# Patient Record
Sex: MALE | URBAN - METROPOLITAN AREA
[De-identification: names, ages, dates, MRNs, and addresses within clinical notes are randomized per-mention and may not be internally consistent; named-entity substitution may affect disease eponyms.]

---

## 2022-11-29 DIAGNOSIS — Z12.11 COLON CANCER SCREENING: Primary | ICD-10-CM

## 2022-12-21 ENCOUNTER — TELEPHONE (OUTPATIENT)
Dept: GASTROENTEROLOGY | Facility: CLINIC | Age: 52
End: 2022-12-21
Payer: COMMERCIAL

## 2022-12-21 DIAGNOSIS — Z12.11 COLON CANCER SCREENING: Primary | ICD-10-CM

## 2022-12-21 NOTE — TELEPHONE ENCOUNTER
----- Message from Dina Kamilla sent at 12/21/2022  1:37 PM CST -----  Type:  Needs Medical Advice    Who Called: Ed Peña    Symptoms (please be specific): =-   How long has patient had these symptoms:  -  Pharmacy name and phone #:  -  Would the patient rather a call back or a response via [facility]? CB   Best Call Back Number: [phone]    Additional Information: pt is waiting on a CB to schedule a colonoscopy, he was referred  Dr Monge  ( referral is in pending authorization status )

## 2022-12-21 NOTE — TELEPHONE ENCOUNTER
Patient calling back to check on colonoscopy. Notified him that referral has been received, and schedulers will be contacting him.

## 2023-02-24 ENCOUNTER — TELEPHONE (OUTPATIENT)
Dept: GASTROENTEROLOGY | Facility: CLINIC | Age: 53
End: 2023-02-24
Payer: COMMERCIAL

## 2023-02-24 VITALS — WEIGHT: 240 LBS | BODY MASS INDEX: 34.36 KG/M2 | HEIGHT: 70 IN

## 2023-02-24 DIAGNOSIS — Z12.11 COLON CANCER SCREENING: Primary | ICD-10-CM

## 2023-02-24 NOTE — TELEPHONE ENCOUNTER
I contacted patient and scheduled Colonoscopy @ [facility] with Dr Contreras on 4/11/23.  BMI is 34.44.  Patient's first Colonoscopy.  I discussed the prep instructions and patient will  instructions.  Jaylyn

## 2023-02-27 RX ORDER — SOD SULF/POT CHLORIDE/MAG SULF 1.479 G
12 TABLET ORAL DAILY
Qty: 24 TABLET | Refills: 0 | Status: SHIPPED | OUTPATIENT
Start: 2023-02-27

## 2023-02-28 NOTE — TELEPHONE ENCOUNTER
"[facility]  [address] [place] [address]  Phone: [phone]  Fax: [phone]    History & Physical         Provider: Dr. Addison Contreras    Patient Name: Ed VELÁZQUEZ (age):1970  52 y.o.           Gender: male   Phone: [phone]     Referring Physician: Bessie Monge     Vital Signs:   Height - 5' 10"  Weight - 240 lbs  BMI - 34.44     Plan: Colonoscopy    Encounter Diagnosis   Name Primary?    Colon cancer screening Yes           History:      Past Medical History:   Diagnosis Date    Anxiety disorder, unspecified     BMI 34.0-34.9,adult     Herniated disc, cervical     Right side    Testosterone deficiency in male       Past Surgical History:   Procedure Laterality Date    TONSILLECTOMY        Medication List with Changes/Refills   New Medications    SOD SULF-POT CHLORIDE-MAG SULF (SUTAB) 1.479-0.188- 0.225 GRAM TABLET    Take 12 tablets by mouth once daily. Take according to package instructions with indicated amount of water. No breakfast day before test. May substitute with Suprep, Clenpiq, Plenvu, Moviprep or GoLytely based on Rx plan and patient preference.   Current Medications    FLUOXETINE 20 MG CAPSULE        TESTOSTERONE (ANDROGEL) 1 % (50 MG/5 GRAM) GLPK          Review of patient's allergies indicates:  No Known Allergies   Family History   Problem Relation Age of Onset    Alzheimer's disease Mother     Hypertension Father     Diabetes Brother     Hypertension Brother     Heart attack Maternal Grandmother     Alzheimer's disease Maternal Grandfather     Other Paternal Grandmother         Natural Causes    Other Paternal Grandfather         Natural Causes      Social History     Tobacco Use    Smoking status: Some Days     Types: Cigars     Passive exposure: Never    Smokeless tobacco: Never   Substance Use Topics    Alcohol use: Yes     Comment: Occasional    Drug " use: Never        Physical Examination:     General Appearance:___________________________  HEENT: _____________________________________  Abdomen:____________________________________  Heart:________________________________________  Lungs:_______________________________________  Extremities:___________________________________  Skin:_________________________________________  Endocrine:____________________________________  Genitourinary:_________________________________  Neurological:__________________________________      Patient has been evaluated immediately prior to sedation and is medically cleared for endoscopy with IVCS as an ASA class: ______      Physician Signature: _________________________       Date: ________  Time: ________

## 2023-04-11 ENCOUNTER — OUTSIDE PLACE OF SERVICE (OUTPATIENT)
Dept: GASTROENTEROLOGY | Facility: CLINIC | Age: 53
End: 2023-04-11

## 2023-04-11 LAB — CRC RECOMMENDATION EXT: NORMAL

## 2023-04-11 PROCEDURE — G0121 COLON CA SCRN NOT HI RSK IND: HCPCS | Mod: ,,, | Performed by: INTERNAL MEDICINE

## 2023-04-11 PROCEDURE — G0121 COLON CA SCRN NOT HI RSK IND: ICD-10-PCS | Mod: ,,, | Performed by: INTERNAL MEDICINE

## 2023-05-02 ENCOUNTER — DOCUMENTATION ONLY (OUTPATIENT)
Dept: GASTROENTEROLOGY | Facility: CLINIC | Age: 53
End: 2023-05-02
Payer: COMMERCIAL

## 2024-10-08 ENCOUNTER — OFFICE VISIT (OUTPATIENT)
Dept: FAMILY MEDICINE | Facility: CLINIC | Age: 54
End: 2024-10-08
Payer: COMMERCIAL

## 2024-10-08 ENCOUNTER — TELEPHONE (OUTPATIENT)
Dept: FAMILY MEDICINE | Facility: CLINIC | Age: 54
End: 2024-10-08

## 2024-10-08 VITALS
TEMPERATURE: 97 F | OXYGEN SATURATION: 98 % | DIASTOLIC BLOOD PRESSURE: 80 MMHG | BODY MASS INDEX: 34.53 KG/M2 | HEIGHT: 70 IN | SYSTOLIC BLOOD PRESSURE: 130 MMHG | RESPIRATION RATE: 15 BRPM | WEIGHT: 241.19 LBS | HEART RATE: 67 BPM

## 2024-10-08 DIAGNOSIS — K21.9 GASTROESOPHAGEAL REFLUX DISEASE, UNSPECIFIED WHETHER ESOPHAGITIS PRESENT: ICD-10-CM

## 2024-10-08 DIAGNOSIS — N52.9 ERECTILE DYSFUNCTION, UNSPECIFIED ERECTILE DYSFUNCTION TYPE: ICD-10-CM

## 2024-10-08 DIAGNOSIS — F41.0 PANIC DISORDER: ICD-10-CM

## 2024-10-08 DIAGNOSIS — Z76.89 ENCOUNTER TO ESTABLISH CARE: Primary | ICD-10-CM

## 2024-10-08 LAB
ABS NRBC COUNT: 0 THOU/UL (ref 0–0.01)
ABSOLUTE BASOPHIL: 0 10*3/UL (ref 0–0.3)
ABSOLUTE EOSINOPHIL: 0.2 10*3/UL (ref 0–0.6)
ABSOLUTE IMMATURE GRAN: 0.01 THOU/UL (ref 0–0.03)
ABSOLUTE LYMPHOCYTE: 1.2 10*3/UL (ref 1.2–4)
ABSOLUTE MONOCYTE: 0.3 10*3/UL (ref 0.1–0.8)
ALBUMIN SERPL BCP-MCNC: 4 G/DL (ref 3.4–5)
ALBUMIN/GLOBULIN RATIO: 1.3 RATIO (ref 1.1–1.8)
ALP SERPL-CCNC: 56 U/L (ref 45–117)
ALT SERPL W P-5'-P-CCNC: 21 U/L (ref 16–61)
ANION GAP SERPL CALC-SCNC: 5 MMOL/L (ref 3–11)
AST SERPL-CCNC: 19 U/L (ref 15–37)
BASOPHILS NFR BLD: 0.5 % (ref 0–3)
BILIRUB SERPL-MCNC: 0.9 MG/DL (ref 0.2–1)
BUN SERPL-MCNC: 15 MG/DL (ref 7–18)
BUN/CREAT SERPL: 13.63 RATIO
CALCIUM SERPL-MCNC: 8.9 MG/DL (ref 8.5–10.1)
CHLORIDE SERPL-SCNC: 104 MMOL/L (ref 98–107)
CHOLEST SERPL-MSCNC: 206 MG/DL
CO2 SERPL-SCNC: 28 MMOL/L (ref 21–32)
CREAT SERPL-MCNC: 1.1 MG/DL (ref 0.7–1.3)
EOSINOPHIL NFR BLD: 4.1 % (ref 0–6)
ERYTHROCYTE [DISTWIDTH] IN BLOOD BY AUTOMATED COUNT: 12.1 % (ref 0–15.5)
ESTIMATED AVG GLUCOSE: 108 MG/DL
GFR ESTIMATION: > 60
GLOBULIN: 3.1 G/DL (ref 2.3–3.5)
GLUCOSE SERPL-MCNC: 91 MG/DL (ref 74–106)
HBA1C MFR BLD: 5.2 % (ref 4.2–6.3)
HCT VFR BLD AUTO: 42.1 % (ref 42–52)
HDL/CHOLESTEROL RATIO: 4 RATIO
HDLC SERPL-MCNC: 52 MG/DL
HGB BLD-MCNC: 14.9 G/DL (ref 14–18)
IMMATURE GRANULOCYTES: 0.3 % (ref 0–0.43)
LDLC SERPL CALC-MCNC: 124.8 MG/DL
LYMPHOCYTES NFR BLD: 31 % (ref 20–45)
MCH RBC QN AUTO: 33.6 PG (ref 27–32)
MCHC RBC AUTO-ENTMCNC: 35.4 % (ref 32–36)
MCV RBC AUTO: 94.8 FL (ref 80–97)
MONOCYTES NFR BLD: 8.2 % (ref 2–10)
NEUTROPHILS # BLD AUTO: 2.2 10*3/UL (ref 1.4–7)
NEUTROPHILS NFR BLD: 55.9 % (ref 50–80)
NUCLEATED RED BLOOD CELLS: 0 % (ref 0–0.2)
PLATELETS: 209 10*3/UL (ref 130–400)
PMV BLD AUTO: 9.7 FL (ref 9.2–12.2)
POTASSIUM SERPL-SCNC: 3.7 MMOL/L (ref 3.5–5.1)
PROT SERPL-MCNC: 7.1 G/DL (ref 6.4–8.2)
PSA: 1.29 NG/ML (ref 0.1–4)
RBC # BLD AUTO: 4.44 10*6/UL (ref 4.7–6.1)
SODIUM BLD-SCNC: 137 MMOL/L (ref 131–143)
T4 FREE SP9 P CHAL SERPL-SCNC: 1.03 NG/DL (ref 0.76–1.46)
TRIGL SERPL-MCNC: 146 MG/DL (ref 0–149)
TSH SERPL DL<=0.005 MIU/L-ACNC: 1.09 UIU/ML (ref 0.36–3.74)
VLDL CHOLESTEROL: 29 MG/DL
WBC # BLD: 3.9 10*3/UL (ref 4.5–10)

## 2024-10-08 PROCEDURE — 3008F BODY MASS INDEX DOCD: CPT | Mod: CPTII,S$GLB,, | Performed by: INTERNAL MEDICINE

## 2024-10-08 PROCEDURE — 3079F DIAST BP 80-89 MM HG: CPT | Mod: CPTII,S$GLB,, | Performed by: INTERNAL MEDICINE

## 2024-10-08 PROCEDURE — 3075F SYST BP GE 130 - 139MM HG: CPT | Mod: CPTII,S$GLB,, | Performed by: INTERNAL MEDICINE

## 2024-10-08 PROCEDURE — 1160F RVW MEDS BY RX/DR IN RCRD: CPT | Mod: CPTII,S$GLB,, | Performed by: INTERNAL MEDICINE

## 2024-10-08 PROCEDURE — 1159F MED LIST DOCD IN RCRD: CPT | Mod: CPTII,S$GLB,, | Performed by: INTERNAL MEDICINE

## 2024-10-08 PROCEDURE — 99386 PREV VISIT NEW AGE 40-64: CPT | Mod: S$GLB,,, | Performed by: INTERNAL MEDICINE

## 2024-10-08 RX ORDER — SILDENAFIL 50 MG/1
50 TABLET, FILM COATED ORAL DAILY PRN
Qty: 30 TABLET | Refills: 1 | Status: SHIPPED | OUTPATIENT
Start: 2024-10-08 | End: 2025-10-08

## 2024-10-08 RX ORDER — FLUOXETINE HYDROCHLORIDE 20 MG/1
20 CAPSULE ORAL DAILY
Qty: 90 CAPSULE | Refills: 3 | Status: SHIPPED | OUTPATIENT
Start: 2024-10-08

## 2024-10-08 RX ORDER — OMEPRAZOLE 40 MG/1
40 CAPSULE, DELAYED RELEASE ORAL DAILY
Qty: 90 CAPSULE | Refills: 1 | Status: SHIPPED | OUTPATIENT
Start: 2024-10-08

## 2024-10-08 RX ORDER — SILDENAFIL 25 MG/1
25 TABLET, FILM COATED ORAL DAILY PRN
Qty: 30 TABLET | Refills: 1 | Status: SHIPPED | OUTPATIENT
Start: 2024-10-08 | End: 2024-10-08

## 2024-10-08 NOTE — TELEPHONE ENCOUNTER
Spoke with Angelia.  Advised her the Viagra needs to be 50mg not 25mg.  She verbalized understanding.

## 2024-10-08 NOTE — PROGRESS NOTES
Subjective:       Patient ID: dE Peña is a 53 y.o. male.    Chief Complaint: Establish Care (New pt. To es. Care for Annual.  Former pt. Of Dr. Bessie Monge. No c/o today.)    HPI    53 y.o. male here to establish care.         Health Maintenance Topics with due status: Not Due       Topic Last Completion Date    Colorectal Cancer Screening 04/11/2023    RSV Vaccine (Age 60+ and Pregnant patients) Not Due       Health Maintenance Due   Topic Date Due    Hepatitis C Screening  Never done    Lipid Panel  Never done    Pneumococcal Vaccines (Age 0-64) (1 of 2 - PCV) Never done    HIV Screening  Never done    TETANUS VACCINE  Never done    Hemoglobin A1c (Diabetic Prevention Screening)  Never done    Shingles Vaccine (1 of 2) Never done    Influenza Vaccine (1) 09/01/2024    COVID-19 Vaccine (1 - 2024-25 season) Never done         Medical Problems:      Patient Active Problem List   Diagnosis    Panic disorder    Gastroesophageal reflux disease    Erectile dysfunction       Current Outpatient Medications on File Prior to Visit   Medication Sig Dispense Refill    UNABLE TO FIND as needed. medication name: Sildenafil Giana      [DISCONTINUED] FLUoxetine 20 MG capsule       [DISCONTINUED] omeprazole/sodium bicarbonate (ZEGERID ORAL) Take by mouth once daily.      [DISCONTINUED] sod sulf-pot chloride-mag sulf (SUTAB) 1.479-0.188- 0.225 gram tablet Take 12 tablets by mouth once daily. Take according to package instructions with indicated amount of water. No breakfast day before test. May substitute with Suprep, Clenpiq, Plenvu, Moviprep or GoLytely based on Rx plan and patient preference. 24 tablet 0    [DISCONTINUED] testosterone (ANDROGEL) 1 % (50 mg/5 gram) GlPk  (Patient not taking: Reported on 10/8/2024)       No current facility-administered medications on file prior to visit.           Past Medical History:   Diagnosis Date    Anxiety disorder, unspecified     BMI 34.0-34.9,adult     Herniated disc,  cervical     Right side    Testosterone deficiency in male      Past Surgical History:   Procedure Laterality Date    TONSILLECTOMY      WISDOM TOOTH EXTRACTION       Family History   Problem Relation Name Age of Onset    Alzheimer's disease Mother      Hypertension Father      Diabetes Brother      Hypertension Brother      Heart attack Maternal Grandmother      Alzheimer's disease Maternal Grandfather      Other Paternal Grandmother          Natural Causes    Other Paternal Grandfather          Natural Causes     Social History     Socioeconomic History    Marital status:    Tobacco Use    Smoking status: Some Days     Types: Cigars     Passive exposure: Never    Smokeless tobacco: Former     Types: Chew   Substance and Sexual Activity    Alcohol use: Yes     Comment: Occasional    Drug use: Never     Review of patient's allergies indicates:  No Known Allergies    Current Outpatient Medications:     UNABLE TO FIND, as needed. medication name: Sildenafil Giana, Disp: , Rfl:     FLUoxetine 20 MG capsule, Take 1 capsule (20 mg total) by mouth once daily., Disp: 90 capsule, Rfl: 3    omeprazole (PRILOSEC) 40 MG capsule, Take 1 capsule (40 mg total) by mouth once daily., Disp: 90 capsule, Rfl: 1    sildenafiL (VIAGRA) 50 MG tablet, Take 1 tablet (50 mg total) by mouth daily as needed for Erectile Dysfunction., Disp: 30 tablet, Rfl: 1        Review of Systems   Constitutional:  Negative for diaphoresis and fever.   HENT:  Negative for postnasal drip, sore throat and trouble swallowing.         + throat clearing   Respiratory:  Negative for cough and shortness of breath.    Cardiovascular:  Negative for chest pain and leg swelling.   Gastrointestinal:  Negative for abdominal pain and blood in stool.   Genitourinary:  Negative for difficulty urinating.   Musculoskeletal:  Negative for gait problem.   Skin:  Negative for pallor.   Neurological:  Negative for syncope and weakness.       Objective:        Vitals:     "10/08/24 1102   BP: 130/80   BP Location: Left arm   Patient Position: Sitting   Pulse: 67   Resp: 15   Temp: 97.2 °F (36.2 °C)   TempSrc: Temporal   SpO2: 98%   Weight: 109.4 kg (241 lb 3.2 oz)   Height: 5' 10" (1.778 m)       Body mass index is 34.61 kg/m².    Physical Exam  Constitutional:       General: He is not in acute distress.     Appearance: He is well-developed. He is not diaphoretic.   HENT:      Head: Normocephalic and atraumatic.      Right Ear: External ear normal.      Left Ear: External ear normal.   Eyes:      Conjunctiva/sclera: Conjunctivae normal.   Cardiovascular:      Rate and Rhythm: Normal rate and regular rhythm.   Pulmonary:      Effort: Pulmonary effort is normal.      Breath sounds: Normal breath sounds.   Abdominal:      General: There is no distension.      Palpations: Abdomen is soft.   Skin:     General: Skin is warm and dry.      Nails: There is no clubbing.   Neurological:      General: No focal deficit present.      Mental Status: He is alert.   Psychiatric:         Behavior: Behavior normal.         Judgment: Judgment normal.         Assessment:     1. Encounter to establish care    2. Panic disorder    3. Gastroesophageal reflux disease, unspecified whether esophagitis present    4. Erectile dysfunction, unspecified erectile dysfunction type           Plan:         1. Encounter to establish care  - labs - fasting today  - immunizations reviewed  - CRC screen utd 2023  - CBC Auto Differential  - Comprehensive Metabolic Panel  - Hemoglobin A1C  - Lipid Panel  - TSH  - T4, Free  - PSA, Screening    2. Panic disorder  - well controlled on current med  - FLUoxetine 20 MG capsule; Take 1 capsule (20 mg total) by mouth once daily.  Dispense: 90 capsule; Refill: 3    3. Gastroesophageal reflux disease, unspecified whether esophagitis present  - reports throat clearing and red appearance to throat but reflux relatively controlled on current otc omeprazole. Will try increasing dose to " 40mg and monitor for improvement vs monitor for allergy symptoms.  - omeprazole (PRILOSEC) 40 MG capsule; Take 1 capsule (40 mg total) by mouth once daily.  Dispense: 90 capsule; Refill: 1    4. Erectile dysfunction, unspecified erectile dysfunction type    - sildenafiL (VIAGRA) 50 MG tablet; Take 1 tablet (50 mg total) by mouth daily as needed for Erectile Dysfunction.  Dispense: 30 tablet; Refill: 1                Unless there are intervening problems, Follow up in about 1 year (around 10/8/2025), or if symptoms worsen or fail to improve, for annual.      Patient note was created using MModal Dictation.  Any errors in syntax or even information may not have been identified and edited on initial review prior to signing this note.

## 2024-10-08 NOTE — TELEPHONE ENCOUNTER
----- Message from Yasmin sent at 10/8/2024 11:37 AM CDT -----  [facility] and can be reached at [phone]. Did not give name of script.      Thanks,

## 2024-10-09 NOTE — PROGRESS NOTES
Results released to patient portal.      Your results have been reviewed.  Labs all look good... Your blood count is normal- no anemia. Electrolytes, liver and kidney function are all within good range. Cholesterol is in good range. Blood sugar is also within normal limits so no evidence of diabetes. The thyroid hormone is in good range. The prostate marker (PSA) is in normal range.   Please call if you have any questions or concerns.    Sincerely,   Dr. Manzo

## 2024-11-18 ENCOUNTER — TELEPHONE (OUTPATIENT)
Dept: FAMILY MEDICINE | Facility: CLINIC | Age: 54
End: 2024-11-18
Payer: COMMERCIAL

## 2024-11-18 NOTE — TELEPHONE ENCOUNTER
----- Message from Mary sent at 11/15/2024  1:41 PM CST -----  Contact: tireney HERNANDEZ  Type: Staff Message     Who called: tierney HERNANDEZ  Call back number: [phone]  Reason for the call: [facility] need a new order  Additional information: n/a

## 2024-11-18 NOTE — TELEPHONE ENCOUNTER
"Pt. States that he talked with [employer].  They told pt. That labs is not coded as "wellness".  Pt. Does not know why.  Gave pt. # to [facility] billing and name of Ariana Gan/Esme Ford at [facility] to call and discuss his bill.  "

## 2025-04-05 DIAGNOSIS — K21.9 GASTROESOPHAGEAL REFLUX DISEASE, UNSPECIFIED WHETHER ESOPHAGITIS PRESENT: ICD-10-CM

## 2025-04-07 RX ORDER — OMEPRAZOLE 40 MG/1
40 CAPSULE, DELAYED RELEASE ORAL DAILY
Qty: 90 CAPSULE | Refills: 0 | Status: SHIPPED | OUTPATIENT
Start: 2025-04-07

## 2025-07-03 ENCOUNTER — TELEPHONE (OUTPATIENT)
Facility: CLINIC | Age: 55
End: 2025-07-03
Payer: COMMERCIAL

## 2025-07-07 DIAGNOSIS — K21.9 GASTROESOPHAGEAL REFLUX DISEASE, UNSPECIFIED WHETHER ESOPHAGITIS PRESENT: ICD-10-CM

## 2025-07-10 DIAGNOSIS — K21.9 GASTROESOPHAGEAL REFLUX DISEASE, UNSPECIFIED WHETHER ESOPHAGITIS PRESENT: ICD-10-CM

## 2025-07-10 RX ORDER — OMEPRAZOLE 40 MG/1
40 CAPSULE, DELAYED RELEASE ORAL DAILY
Qty: 90 CAPSULE | Refills: 0 | Status: SHIPPED | OUTPATIENT
Start: 2025-07-10

## 2025-07-11 RX ORDER — OMEPRAZOLE 40 MG/1
40 CAPSULE, DELAYED RELEASE ORAL DAILY
Qty: 90 CAPSULE | Refills: 0 | OUTPATIENT
Start: 2025-07-11